# Patient Record
Sex: MALE | Race: WHITE | Employment: OTHER | URBAN - METROPOLITAN AREA
[De-identification: names, ages, dates, MRNs, and addresses within clinical notes are randomized per-mention and may not be internally consistent; named-entity substitution may affect disease eponyms.]

---

## 2017-03-27 PROBLEM — N18.2 CKD STAGE 2 DUE TO TYPE 2 DIABETES MELLITUS (HCC): Status: ACTIVE | Noted: 2017-03-27

## 2017-03-27 PROBLEM — E11.22 CKD STAGE 2 DUE TO TYPE 2 DIABETES MELLITUS (HCC): Status: ACTIVE | Noted: 2017-03-27

## 2017-03-27 PROBLEM — I70.0 AORTIC ATHEROSCLEROSIS (HCC): Status: ACTIVE | Noted: 2017-03-27

## 2017-03-27 PROBLEM — J84.10 PULMONARY GRANULOMA (HCC): Status: ACTIVE | Noted: 2017-03-27

## 2017-04-03 PROBLEM — J84.10 PULMONARY GRANULOMA (HCC): Status: RESOLVED | Noted: 2017-03-27 | Resolved: 2017-04-03

## 2017-05-22 PROBLEM — M17.0 OSTEOARTHRITIS OF BOTH KNEES, UNSPECIFIED OSTEOARTHRITIS TYPE: Status: ACTIVE | Noted: 2017-05-22

## 2017-06-14 PROCEDURE — 87086 URINE CULTURE/COLONY COUNT: CPT | Performed by: NURSE PRACTITIONER

## 2017-06-14 PROCEDURE — 87088 URINE BACTERIA CULTURE: CPT | Performed by: NURSE PRACTITIONER

## 2017-06-14 PROCEDURE — 87186 SC STD MICRODIL/AGAR DIL: CPT | Performed by: NURSE PRACTITIONER

## 2017-06-27 PROCEDURE — 87086 URINE CULTURE/COLONY COUNT: CPT | Performed by: INTERNAL MEDICINE

## 2017-06-27 PROCEDURE — 81003 URINALYSIS AUTO W/O SCOPE: CPT | Performed by: INTERNAL MEDICINE

## 2017-08-09 PROBLEM — N18.2 CKD STAGE 2 DUE TO TYPE 2 DIABETES MELLITUS (HCC): Status: RESOLVED | Noted: 2017-03-27 | Resolved: 2017-08-09

## 2017-08-09 PROBLEM — E11.22 CKD STAGE 2 DUE TO TYPE 2 DIABETES MELLITUS (HCC): Status: RESOLVED | Noted: 2017-03-27 | Resolved: 2017-08-09

## 2017-08-09 PROBLEM — E11.9 TYPE 2 DIABETES MELLITUS WITHOUT COMPLICATION, WITHOUT LONG-TERM CURRENT USE OF INSULIN (HCC): Status: ACTIVE | Noted: 2017-08-09

## 2018-03-01 PROBLEM — K04.7 INFECTION OF TOOTH: Status: ACTIVE | Noted: 2018-03-01

## 2018-05-14 ENCOUNTER — LAB ENCOUNTER (OUTPATIENT)
Dept: LAB | Age: 63
End: 2018-05-14
Attending: FAMILY MEDICINE
Payer: MEDICARE

## 2018-05-14 DIAGNOSIS — M17.0 OSTEOARTHRITIS OF BOTH KNEES, UNSPECIFIED OSTEOARTHRITIS TYPE: ICD-10-CM

## 2018-05-14 PROCEDURE — 89051 BODY FLUID CELL COUNT: CPT

## 2018-05-14 PROCEDURE — 89050 BODY FLUID CELL COUNT: CPT

## 2018-05-14 PROCEDURE — 87070 CULTURE OTHR SPECIMN AEROBIC: CPT

## 2018-05-14 PROCEDURE — 89060 EXAM SYNOVIAL FLUID CRYSTALS: CPT

## 2018-05-14 PROCEDURE — 87205 SMEAR GRAM STAIN: CPT

## 2018-06-22 PROBLEM — D3A.012 CARCINOID TUMOR OF ILEUM: Status: ACTIVE | Noted: 2018-06-22

## 2018-06-22 PROBLEM — D3A.012 CARCINOID TUMOR OF ILEUM (HCC): Status: ACTIVE | Noted: 2018-06-22

## 2018-08-28 PROCEDURE — 81003 URINALYSIS AUTO W/O SCOPE: CPT | Performed by: UROLOGY

## 2018-08-29 PROCEDURE — 82570 ASSAY OF URINE CREATININE: CPT | Performed by: INTERNAL MEDICINE

## 2018-08-29 PROCEDURE — 82043 UR ALBUMIN QUANTITATIVE: CPT | Performed by: INTERNAL MEDICINE

## 2018-10-19 PROBLEM — D18.00 HEMANGIOMA: Status: ACTIVE | Noted: 2018-10-19

## 2018-11-12 PROCEDURE — 87086 URINE CULTURE/COLONY COUNT: CPT | Performed by: PHYSICIAN ASSISTANT

## 2018-12-04 PROBLEM — E11.65 TYPE 2 DIABETES MELLITUS WITH HYPERGLYCEMIA, WITH LONG-TERM CURRENT USE OF INSULIN (HCC): Status: ACTIVE | Noted: 2017-08-09

## 2018-12-04 PROBLEM — E11.42 TYPE 2 DIABETES MELLITUS WITH DIABETIC POLYNEUROPATHY, WITH LONG-TERM CURRENT USE OF INSULIN (HCC): Status: ACTIVE | Noted: 2018-12-04

## 2018-12-04 PROBLEM — Z79.4 TYPE 2 DIABETES MELLITUS WITH MICROALBUMINURIA, WITH LONG-TERM CURRENT USE OF INSULIN (HCC): Status: ACTIVE | Noted: 2018-12-04

## 2018-12-04 PROBLEM — Z79.4 TYPE 2 DIABETES MELLITUS WITH DIABETIC POLYNEUROPATHY, WITH LONG-TERM CURRENT USE OF INSULIN (HCC): Status: ACTIVE | Noted: 2018-12-04

## 2018-12-04 PROBLEM — E78.5 TYPE 2 DIABETES MELLITUS WITH HYPERLIPIDEMIA (HCC): Status: ACTIVE | Noted: 2018-12-04

## 2018-12-04 PROBLEM — E11.29 TYPE 2 DIABETES MELLITUS WITH MICROALBUMINURIA, WITH LONG-TERM CURRENT USE OF INSULIN (HCC): Status: ACTIVE | Noted: 2018-12-04

## 2018-12-04 PROBLEM — E11.69 TYPE 2 DIABETES MELLITUS WITH HYPERLIPIDEMIA (HCC): Status: ACTIVE | Noted: 2018-12-04

## 2018-12-04 PROBLEM — Z79.4 TYPE 2 DIABETES MELLITUS WITH HYPERGLYCEMIA, WITH LONG-TERM CURRENT USE OF INSULIN (HCC): Status: ACTIVE | Noted: 2017-08-09

## 2018-12-04 PROBLEM — R80.9 TYPE 2 DIABETES MELLITUS WITH MICROALBUMINURIA, WITH LONG-TERM CURRENT USE OF INSULIN (HCC): Status: ACTIVE | Noted: 2018-12-04

## 2018-12-04 PROCEDURE — 82043 UR ALBUMIN QUANTITATIVE: CPT | Performed by: INTERNAL MEDICINE

## 2018-12-04 PROCEDURE — 82570 ASSAY OF URINE CREATININE: CPT | Performed by: INTERNAL MEDICINE

## 2018-12-21 PROBLEM — Z79.899 ENCOUNTER FOR MONITORING PAZOPANIB THERAPY: Status: ACTIVE | Noted: 2018-12-21

## 2018-12-21 PROBLEM — Z51.81 ENCOUNTER FOR MONITORING PAZOPANIB THERAPY: Status: ACTIVE | Noted: 2018-12-21

## 2019-02-22 PROCEDURE — 82043 UR ALBUMIN QUANTITATIVE: CPT | Performed by: NURSE PRACTITIONER

## 2019-02-22 PROCEDURE — 82570 ASSAY OF URINE CREATININE: CPT | Performed by: NURSE PRACTITIONER

## 2020-03-11 PROBLEM — E66.9 OBESITY (BMI 30-39.9): Status: ACTIVE | Noted: 2020-03-11

## 2020-04-22 PROBLEM — J43.9 EMPHYSEMA OF LUNG (HCC): Status: ACTIVE | Noted: 2020-04-22

## 2020-07-08 PROBLEM — K04.7 INFECTION OF TOOTH: Status: RESOLVED | Noted: 2018-03-01 | Resolved: 2020-07-08

## 2020-09-04 PROBLEM — E11.59 HYPERTENSION ASSOCIATED WITH TYPE 2 DIABETES MELLITUS (HCC): Status: ACTIVE | Noted: 2020-09-04

## 2020-09-04 PROBLEM — I15.2 HYPERTENSION ASSOCIATED WITH TYPE 2 DIABETES MELLITUS (HCC): Status: ACTIVE | Noted: 2020-09-04

## 2020-09-04 PROBLEM — C79.00: Status: ACTIVE | Noted: 2020-09-04

## 2020-09-04 PROBLEM — C64.2 MALIGNANT NEOPLASM OF LEFT KIDNEY, EXCEPT RENAL PELVIS (HCC): Status: ACTIVE | Noted: 2020-09-04

## 2020-09-16 PROBLEM — E11.42 TYPE 2 DIABETES MELLITUS WITH DIABETIC POLYNEUROPATHY, WITH LONG-TERM CURRENT USE OF INSULIN (HCC): Status: RESOLVED | Noted: 2018-12-04 | Resolved: 2020-09-16

## 2020-09-16 PROBLEM — Z79.4 TYPE 2 DIABETES MELLITUS WITH DIABETIC POLYNEUROPATHY, WITH LONG-TERM CURRENT USE OF INSULIN (HCC): Status: RESOLVED | Noted: 2018-12-04 | Resolved: 2020-09-16

## 2020-09-18 PROBLEM — C64.2: Status: ACTIVE | Noted: 2020-09-18

## 2020-09-18 PROBLEM — R59.0 MEDIASTINAL LYMPHADENOPATHY: Status: ACTIVE | Noted: 2020-09-18

## 2020-09-21 PROBLEM — E11.29 TYPE 2 DIABETES MELLITUS WITH MICROALBUMINURIA (HCC): Status: ACTIVE | Noted: 2020-09-21

## 2020-09-21 PROBLEM — R80.9 TYPE 2 DIABETES MELLITUS WITH MICROALBUMINURIA (HCC): Status: ACTIVE | Noted: 2020-09-21

## 2020-12-04 PROBLEM — C7A.012 MALIGNANT CARCINOID TUMOR OF ILEUM (HCC): Status: ACTIVE | Noted: 2020-12-04

## 2020-12-04 PROBLEM — K59.1 FUNCTIONAL DIARRHEA: Status: ACTIVE | Noted: 2020-12-04

## 2020-12-14 PROBLEM — T45.1X5A IMMUNOSUPPRESSED DUE TO CHEMOTHERAPY (HCC): Status: ACTIVE | Noted: 2020-12-14

## 2020-12-14 PROBLEM — Z79.899 IMMUNOSUPPRESSED DUE TO CHEMOTHERAPY (HCC): Status: ACTIVE | Noted: 2020-12-14

## 2020-12-14 PROBLEM — D84.821 IMMUNOSUPPRESSED DUE TO CHEMOTHERAPY (HCC): Status: ACTIVE | Noted: 2020-12-14

## 2021-01-15 PROBLEM — C79.51 BONE METASTASES (HCC): Status: ACTIVE | Noted: 2021-01-15

## 2021-01-15 PROBLEM — C79.51 BONE METASTASES: Status: ACTIVE | Noted: 2021-01-15

## 2021-02-15 PROBLEM — E27.49 ADRENAL FAILURE (HCC): Status: ACTIVE | Noted: 2021-02-11

## 2021-02-15 PROBLEM — E27.2 ADRENAL CRISIS (HCC): Status: ACTIVE | Noted: 2021-02-11

## 2021-03-01 PROBLEM — I77.819 AORTIC ECTASIA (HCC): Status: ACTIVE | Noted: 2021-03-01

## 2021-03-02 PROBLEM — R80.9 UNCONTROLLED TYPE 2 DIABETES MELLITUS WITH MICROALBUMINURIA, WITHOUT LONG-TERM CURRENT USE OF INSULIN (HCC): Status: ACTIVE | Noted: 2020-09-21

## 2021-03-02 PROBLEM — IMO0002 UNCONTROLLED TYPE 2 DIABETES MELLITUS WITH MICROALBUMINURIA, WITHOUT LONG-TERM CURRENT USE OF INSULIN: Status: ACTIVE | Noted: 2020-09-21

## 2021-03-02 PROBLEM — E78.2 HYPERLIPEMIA, MIXED: Status: ACTIVE | Noted: 2021-03-02

## 2021-03-02 PROBLEM — E11.69 TYPE 2 DIABETES MELLITUS WITH HYPERLIPIDEMIA (HCC): Status: RESOLVED | Noted: 2018-12-04 | Resolved: 2021-03-02

## 2021-03-02 PROBLEM — E11.65 UNCONTROLLED TYPE 2 DIABETES MELLITUS WITH MICROALBUMINURIA, WITHOUT LONG-TERM CURRENT USE OF INSULIN (HCC): Status: ACTIVE | Noted: 2020-09-21

## 2021-03-02 PROBLEM — D3A.012 CARCINOID TUMOR OF ILEUM: Status: RESOLVED | Noted: 2018-06-22 | Resolved: 2021-03-02

## 2021-03-02 PROBLEM — E11.59 HYPERTENSION ASSOCIATED WITH TYPE 2 DIABETES MELLITUS (HCC): Status: RESOLVED | Noted: 2020-09-04 | Resolved: 2021-03-02

## 2021-03-02 PROBLEM — E78.5 TYPE 2 DIABETES MELLITUS WITH HYPERLIPIDEMIA (HCC): Status: RESOLVED | Noted: 2018-12-04 | Resolved: 2021-03-02

## 2021-03-02 PROBLEM — Z79.899 ENCOUNTER FOR MONITORING PAZOPANIB THERAPY: Status: RESOLVED | Noted: 2018-12-21 | Resolved: 2021-03-02

## 2021-03-02 PROBLEM — I15.2 HYPERTENSION ASSOCIATED WITH TYPE 2 DIABETES MELLITUS (HCC): Status: RESOLVED | Noted: 2020-09-04 | Resolved: 2021-03-02

## 2021-03-02 PROBLEM — D18.00 HEMANGIOMA: Status: RESOLVED | Noted: 2018-10-19 | Resolved: 2021-03-02

## 2021-03-02 PROBLEM — Z51.81 ENCOUNTER FOR MONITORING PAZOPANIB THERAPY: Status: RESOLVED | Noted: 2018-12-21 | Resolved: 2021-03-02

## 2021-03-02 PROBLEM — E11.29 UNCONTROLLED TYPE 2 DIABETES MELLITUS WITH MICROALBUMINURIA, WITHOUT LONG-TERM CURRENT USE OF INSULIN (HCC): Status: ACTIVE | Noted: 2020-09-21

## 2021-03-02 PROBLEM — M17.0 OSTEOARTHRITIS OF BOTH KNEES, UNSPECIFIED OSTEOARTHRITIS TYPE: Status: RESOLVED | Noted: 2017-05-22 | Resolved: 2021-03-02

## 2021-03-02 PROBLEM — D3A.012 CARCINOID TUMOR OF ILEUM (HCC): Status: RESOLVED | Noted: 2018-06-22 | Resolved: 2021-03-02

## 2021-04-30 PROBLEM — E27.3 ADRENAL INSUFFICIENCY DUE TO CANCER THERAPY (HCC): Status: ACTIVE | Noted: 2021-02-11

## 2021-04-30 PROBLEM — Z79.4 UNCONTROLLED TYPE 2 DIABETES MELLITUS WITH HYPERGLYCEMIA, WITH LONG-TERM CURRENT USE OF INSULIN (HCC): Status: ACTIVE | Noted: 2021-04-30

## 2021-04-30 PROBLEM — Z79.4 LONG-TERM INSULIN USE (HCC): Status: ACTIVE | Noted: 2021-04-30

## 2021-04-30 PROBLEM — E11.65 UNCONTROLLED TYPE 2 DIABETES MELLITUS WITH HYPERGLYCEMIA, WITH LONG-TERM CURRENT USE OF INSULIN (HCC): Status: ACTIVE | Noted: 2021-04-30

## 2021-04-30 PROBLEM — E78.5 HYPERLIPIDEMIA LDL GOAL <100: Status: ACTIVE | Noted: 2021-03-02

## 2021-04-30 PROBLEM — I10 HYPERTENSION, ESSENTIAL, BENIGN: Status: ACTIVE | Noted: 2021-04-30

## 2021-05-25 PROBLEM — E11.21 CONTROLLED TYPE 2 DIABETES MELLITUS WITH DIABETIC NEPHROPATHY, WITHOUT LONG-TERM CURRENT USE OF INSULIN (HCC): Status: ACTIVE | Noted: 2021-05-25

## 2021-09-29 PROBLEM — E11.65 UNCONTROLLED TYPE 2 DIABETES MELLITUS WITH HYPERGLYCEMIA, WITH LONG-TERM CURRENT USE OF INSULIN (HCC): Status: RESOLVED | Noted: 2021-04-30 | Resolved: 2021-09-29

## 2021-09-29 PROBLEM — E11.29 UNCONTROLLED TYPE 2 DIABETES MELLITUS WITH MICROALBUMINURIA, WITHOUT LONG-TERM CURRENT USE OF INSULIN (HCC): Status: RESOLVED | Noted: 2020-09-21 | Resolved: 2021-09-29

## 2021-09-29 PROBLEM — R80.9 UNCONTROLLED TYPE 2 DIABETES MELLITUS WITH MICROALBUMINURIA, WITHOUT LONG-TERM CURRENT USE OF INSULIN (HCC): Status: RESOLVED | Noted: 2020-09-21 | Resolved: 2021-09-29

## 2021-09-29 PROBLEM — D69.6 PLATELETS DECREASED (HCC): Status: ACTIVE | Noted: 2021-09-29

## 2021-09-29 PROBLEM — Z79.4 LONG-TERM INSULIN USE (HCC): Status: RESOLVED | Noted: 2021-04-30 | Resolved: 2021-09-29

## 2021-09-29 PROBLEM — Z79.4 UNCONTROLLED TYPE 2 DIABETES MELLITUS WITH HYPERGLYCEMIA, WITH LONG-TERM CURRENT USE OF INSULIN (HCC): Status: RESOLVED | Noted: 2021-04-30 | Resolved: 2021-09-29

## 2021-09-29 PROBLEM — E11.65 UNCONTROLLED TYPE 2 DIABETES MELLITUS WITH MICROALBUMINURIA, WITHOUT LONG-TERM CURRENT USE OF INSULIN (HCC): Status: RESOLVED | Noted: 2020-09-21 | Resolved: 2021-09-29

## 2021-09-29 PROBLEM — IMO0002 UNCONTROLLED TYPE 2 DIABETES MELLITUS WITH MICROALBUMINURIA, WITHOUT LONG-TERM CURRENT USE OF INSULIN: Status: RESOLVED | Noted: 2020-09-21 | Resolved: 2021-09-29

## 2022-01-28 PROBLEM — E11.36 TYPE 2 DIABETES MELLITUS WITH CATARACT (HCC): Status: ACTIVE | Noted: 2022-01-28

## 2022-01-28 PROBLEM — E11.36 TYPE 2 DIABETES MELLITUS WITH CATARACT (HCC): Status: RESOLVED | Noted: 2022-01-28 | Resolved: 2022-01-28

## 2022-01-28 PROBLEM — I82.451 ACUTE DEEP VEIN THROMBOSIS (DVT) OF RIGHT PERONEAL VEIN (HCC): Status: ACTIVE | Noted: 2022-01-28

## 2024-08-06 ENCOUNTER — TELEPHONE (OUTPATIENT)
Dept: CASE MANAGEMENT | Facility: HOSPITAL | Age: 69
End: 2024-08-06
